# Patient Record
Sex: FEMALE | Race: WHITE | NOT HISPANIC OR LATINO | Employment: UNEMPLOYED | URBAN - METROPOLITAN AREA
[De-identification: names, ages, dates, MRNs, and addresses within clinical notes are randomized per-mention and may not be internally consistent; named-entity substitution may affect disease eponyms.]

---

## 2020-10-21 ENCOUNTER — APPOINTMENT (OUTPATIENT)
Dept: WOMENS IMAGING | Facility: HOSPITAL | Age: 45
End: 2020-10-21

## 2020-10-21 PROCEDURE — 77063 BREAST TOMOSYNTHESIS BI: CPT | Performed by: RADIOLOGY

## 2020-10-21 PROCEDURE — 77067 SCR MAMMO BI INCL CAD: CPT | Performed by: RADIOLOGY

## 2020-10-27 ENCOUNTER — TRANSCRIBE ORDERS (OUTPATIENT)
Dept: ADMINISTRATIVE | Facility: HOSPITAL | Age: 45
End: 2020-10-27

## 2020-10-27 DIAGNOSIS — R92.8 ABNORMAL MAMMOGRAM: Primary | ICD-10-CM

## 2020-11-11 ENCOUNTER — HOSPITAL ENCOUNTER (OUTPATIENT)
Dept: MAMMOGRAPHY | Facility: HOSPITAL | Age: 45
Discharge: HOME | End: 2020-11-11

## 2020-11-11 ENCOUNTER — HOSPITAL ENCOUNTER (OUTPATIENT)
Dept: ULTRASOUND IMAGING | Facility: HOSPITAL | Age: 45
Discharge: HOME | End: 2020-11-11

## 2020-11-11 DIAGNOSIS — R92.8 ABNORMAL MAMMOGRAM: ICD-10-CM

## 2020-11-11 PROCEDURE — 77065 DX MAMMO INCL CAD UNI: CPT

## 2020-11-11 PROCEDURE — 76642 ULTRASOUND BREAST LIMITED: CPT

## 2022-02-22 ENCOUNTER — APPOINTMENT (OUTPATIENT)
Dept: WOMENS IMAGING | Facility: HOSPITAL | Age: 47
End: 2022-02-22

## 2022-02-22 PROCEDURE — 77066 DX MAMMO INCL CAD BI: CPT | Performed by: RADIOLOGY

## 2022-02-22 PROCEDURE — G0279 TOMOSYNTHESIS, MAMMO: HCPCS | Performed by: RADIOLOGY

## 2022-02-22 PROCEDURE — 77062 BREAST TOMOSYNTHESIS BI: CPT | Performed by: RADIOLOGY

## 2022-02-22 PROCEDURE — 76641 ULTRASOUND BREAST COMPLETE: CPT | Performed by: RADIOLOGY

## 2022-07-10 NOTE — PROGRESS NOTES
GENERAL SURGERY HISTORY AND PHYSICAL     SUMMARY:  46 y.o. F with a new diagnosis of right nipple pruritus.  -Shawnayamile Edis lifetime breast cancer risk: 10.8%. Christina model 5-year breast cancer risk: 0.5%. This patient does not qualify for high risk screening.    (1) Right nipple pruritus:  - No concerning findings on exam today. No skin changes or masses appreciated.   - Bilateral Diagnostic Mammogram and US 2/2022 reviewed, BIRADS 2.  - Recommend she continue with screening mammogram in 2/2023, follow up in the office after to review results and repeat clinical exam.   - Discussed with patient if she develops worsening symptoms to contact the office.    (2) Patient was concerned regarding a right axillary nodule. No nodule noted on exam. Recent imaging negative.    Referring Provider: No ref. provider found    Chief complaint: right nipple itching    HPI: Ms. Yuridia Perez is a 47 yo woman, seen at the request of No ref. provider found, for a new diagnosis of right nipple pruritus.      Patient states for the past 2 years, she has had several episodes of severe itching around her right areola and nipple. She reports she has tried a topical steroid cream in the past and had no improvement in her symptoms. She denies any breast pain but does have nipple sensitivity. She reports her right nipple is slightly darker compared to her left nipple. She also noticed earlier this year, she felt a nodule in her right axilla, more noticeable and sensitive when her bra rubs against the area.      Her work-up is detailed in the breast history section below. She has had regular annual mammograms. She denies any prior history of abnormal mammograms or breast biopsies. She has been recalled in the past due to dense breast tissue. She denies any breast lumps, breast pain, skin changes, or nipple discharge.    She has a family history of breast cancer in her maternal grandmother (unsure of age at diagnosis). She denies any family  history of ovarian cancer.     TIMELINE OF WORKUP:  2022 Bilateral Digital Diagnostic Mammogram with [facility], Right Breast Complete Breast Ultrasound:  The breast are heterogeneously dense, which may obscure small masses.  There are bilateral retropectoral silicone gel implants.  Finding 1: The parenchyma includes stable nodular asymmetries.  There are no new suspicious masses, calcifications, or areas of architectural distortion.  There is no skin thickening.  Finding 2: There is no mammographic correlate for the palpable area of concern, as marked with a metallic BB.  Finding 2: There is no worrisome sonographic correlate for the pinpointed palpable area of concern within the right axillary tail.  Sonographically normal axillary lymph nodes are noted.  There are no suspicious masses, areas of focal shadowing or distortion seen.  All 4 quadrants of the breast, axilla, and the retroareolar region were imaged.  Impression:  Finding 1: There is no mammographic evidence of malignancy.  Finding 2: There is no worrisome mammographic or sonographic correlate to explain the right axillary tail palpable area of concern.  Screening mammogram in 1 year is recommended.  BI-RADS Category 2: Benign findings    MEDICAL HISTORY:     Gynecologic History:    G:3 P:3 AB:0  Age at first childbirth: 21  Lactation/How lon  Age at menarche: 17  Age at menopause: N/A  Total years of oral contraceptive use: 1  Total years of hormone replacement therapy: 0    Past Medical History:    · Asthma  · Claustrophobia     Past Surgical History:    · Mastopexy breast lift - 12 years ago - Dr. Chung  · Sheet metal removal from leg  · Diagnostic Laparoscopy for enodmetriosis     Family History:    Family History   Problem Relation Age of Onset   • Heart disease Mother    • Hypertension Father    • Diabetes Father    ·  Breast cancer Maternal grandmother    • Gout Brother      Social History:   Social History     Socioeconomic History   •  Marital status: Unknown   Tobacco Use   • Smoking status: Never Smoker   • Smokeless tobacco: Never Used   Vaping Use   • Vaping Use: Never used   Substance and Sexual Activity   • Alcohol use: Yes     Comment: 8-10 drinks weekly   • Drug use: Never     • Caffeine - daily coffee     ALLERGIES:   Allergies   Allergen Reactions   • Contrast Dye Hives   • Decadron [Dexamethasone] Hives       MEDICATIONS:     Current Outpatient Medications:   •  Probiotic Product (PROBIOTIC DAILY PO), Take  by mouth Daily., Disp: , Rfl:     LABS:    • Labs from 3/22/2022 reviewed    ROS:   Influenza-like illness: no fever, no cough, no sore throat, no body aches, no loss of sense of taste or smell, no known exposure to person with Covid-19.  Constitutional: Negative for fevers or chills  HENT: Negative for hearing loss or runny nose  Eyes: Negative for vision changes or scleral icterus  Respiratory: Negative for cough or shortness of breath  Cardiovascular: Negative for chest pain or heart palpitations  Gastrointestinal: Negative for abdominal pain, nausea, vomiting, constipation, melena, or hematochezia  Genitourinary: Negative for hematuria or dysuria  Musculoskeletal: Negative for joint swelling or gait instability  Neurologic: Negative for tremors or seizures  Psychiatric: Negative for suicidal ideations or depression  All other systems reviewed and negative    PHYSICAL EXAM:   • Constitutional: Well-developed, well-nourished, no acute distress  • Eyes: Conjunctiva normal, sclera nonicteric  • ENMT: Hearing grossly normal, oral mucosa moist  • Neck: Supple, no palpable mass, trachea midline  • Respiratory: Clear to auscultation, normal inspiratory effort  • Cardiovascular: Regular rate, no murmur, no peripheral edema, no jugular venous distention  • Breast: symmetric  o Right: No visible abnormalities on inspection while seated, with arms raised or hands on hips. No masses, skin changes, or nipple abnormalities.  o Left: No visible  abnormalities on inspection while seated, with arms raised or hands on hips. No masses, skin changes, or nipple abnormalities.  o No clinical chest wall involvement.  • Lymphatics (palpable nodes): Negative cervical, supraclavicular or axillary lymphadenopathy  • Skin:  Warm, dry, no rash on visualized skin surfaces  • Musculoskeletal: Symmetric strength, normal gait  • Psychiatric: Alert and oriented ×3, normal affect     Bernice Gaytan PA-C  General Surgery    [facility]  [address], [address]  [place], [place], [facility]– [place]  P: [phone]  F: [phone]

## 2022-07-13 ENCOUNTER — OFFICE VISIT (OUTPATIENT)
Dept: SURGERY | Facility: CLINIC | Age: 47
End: 2022-07-13

## 2022-07-13 VITALS — BODY MASS INDEX: 26.93 KG/M2 | HEIGHT: 67 IN | WEIGHT: 171.6 LBS

## 2022-07-13 DIAGNOSIS — N64.59 NIPPLE PROBLEM: Primary | ICD-10-CM

## 2022-07-13 PROCEDURE — 99203 OFFICE O/P NEW LOW 30 MIN: CPT
